# Patient Record
(demographics unavailable — no encounter records)

---

## 2025-01-31 NOTE — PHYSICAL EXAM
[NL (40)] : plantar flexion 40 degrees [NL 30)] : inversion 30 degrees [NL (20)] : eversion 20 degrees [5___] : inversion 5[unfilled]/5 [2+] : posterior tibialis pulse: 2+ [Normal] : saphenous nerve sensation normal [4___] : eversion 4[unfilled]/5 [] : pain when isolating peroneal longus tendon [Right] : right foot [Weight -] : weightbearing [FreeTextEntry8] : ttp peroneus brevis insertion.  [de-identified] : Achilles insertion spur, anterior distal tibial spur, dorsal talar spur. Mild narrowing 1st MTP joint.

## 2025-01-31 NOTE — DISCUSSION/SUMMARY
[de-identified] : WBAT in supportive footwear, lace up ankle brace.   Recommend a course of PT. Ice to affected area. Stretching exercises recommended and demonstrated to patient.   The patient was explained the options as well as benefits of over the counter verses prescription strength nonsteroidal anti-inflammatory medication. Prescription strength medication is recommended to suppress chronic inflammation. The patient opts for a prescription strength medication.

## 2025-01-31 NOTE — HISTORY OF PRESENT ILLNESS
[Sudden] : sudden [6] : 6 [2] : 2 [Sharp] : sharp [Frequent] : frequent [Meds] : meds [Walking] : walking [de-identified] : Pt. is a 59 year old female who presents for evaluation of her RT foot. Denies trauma. She reports lateral sided foot pain since late January 2025. WB without assistive device. No formal treatment to date. No previous injury/problem with RT foot.  [] : no

## 2025-02-03 NOTE — HISTORY OF PRESENT ILLNESS
[Sudden] : sudden [6] : 6 [2] : 2 [Sharp] : sharp [Frequent] : frequent [Meds] : meds [Walking] : walking [de-identified] : Patient returns for her right peroneal tendonitis. She reports lateral sided foot pain since late January 2025 without trauma.  She has been using the lace up brace, icing and taking meloxicam.  She reports some mild relief but still has pain.  She has yet to start PT.  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 [] : no

## 2025-02-03 NOTE — DISCUSSION/SUMMARY
[de-identified] : Patient is still recommended to continue with her brace and to start PT. Continue with daily icing Since she is still complaining of pain, she will try a MDP and was told to stop taking the meloxicam while taking the steroids. She can resume the meloxicam after completing the MDP.

## 2025-02-03 NOTE — PHYSICAL EXAM
[NL (40)] : plantar flexion 40 degrees [NL 30)] : inversion 30 degrees [NL (20)] : eversion 20 degrees [5___] : dorsiflexion 5[unfilled]/5 [2+] : posterior tibialis pulse: 2+ [Normal] : saphenous nerve sensation normal [Right] : right foot [Weight -] : weightbearing [de-identified] : Achilles insertion spur, anterior distal tibial spur, dorsal talar spur. Mild narrowing 1st MTP joint.  [4___] : inversion 4[unfilled]/5 [] : non-antalgic [FreeTextEntry8] : ttp peroneus brevis insertion.

## 2025-02-28 NOTE — PHYSICAL EXAM
[Right] : right foot and ankle [NL (40)] : plantar flexion 40 degrees [NL 30)] : inversion 30 degrees [NL (20)] : eversion 20 degrees [2+] : posterior tibialis pulse: 2+ [Normal] : saphenous nerve sensation normal [5___] : eversion 5[unfilled]/5 [] : non-antalgic [FreeTextEntry8] : ttp peroneus brevis insertion.

## 2025-02-28 NOTE — HISTORY OF PRESENT ILLNESS
[Sudden] : sudden [6] : 6 [2] : 2 [Sharp] : sharp [Frequent] : frequent [Meds] : meds [Walking] : walking [de-identified] : Patient returns for her right peroneal tendonitis. Symptoms since late January 2025 without trauma.  Since last visit she has been going to PT and takes meloxicam as needed.  She did not take the MDP.  She states she is doing much better but still has some intermittent pain. 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

## 2025-02-28 NOTE — DISCUSSION/SUMMARY
[de-identified] : Patient is doing much better. She will finish out her course of PT and then will continue with her home exercises. Meloxicam 15 mg qd to be taken as needed. Follow up prn.